# Patient Record
Sex: FEMALE
[De-identification: names, ages, dates, MRNs, and addresses within clinical notes are randomized per-mention and may not be internally consistent; named-entity substitution may affect disease eponyms.]

---

## 2019-05-01 ENCOUNTER — TRANSCRIPTION ENCOUNTER (OUTPATIENT)
Age: 24
End: 2019-05-01

## 2020-05-05 ENCOUNTER — OFFICE VISIT (OUTPATIENT)
Dept: FAMILY MEDICINE CLINIC | Facility: CLINIC | Age: 25
End: 2020-05-05

## 2020-05-05 VITALS
HEIGHT: 66 IN | DIASTOLIC BLOOD PRESSURE: 70 MMHG | OXYGEN SATURATION: 98 % | BODY MASS INDEX: 24.59 KG/M2 | WEIGHT: 153 LBS | SYSTOLIC BLOOD PRESSURE: 106 MMHG | HEART RATE: 73 BPM | RESPIRATION RATE: 18 BRPM | TEMPERATURE: 98.2 F

## 2020-05-05 DIAGNOSIS — Z01.419 WELL WOMAN EXAM: ICD-10-CM

## 2020-05-05 DIAGNOSIS — Z23 IMMUNIZATION DUE: ICD-10-CM

## 2020-05-05 DIAGNOSIS — R22.1 MASS OF SUBMENTAL REGION: Primary | ICD-10-CM

## 2020-05-05 DIAGNOSIS — L72.3 SEBACEOUS CYST: ICD-10-CM

## 2020-05-05 PROCEDURE — 90715 TDAP VACCINE 7 YRS/> IM: CPT | Performed by: NURSE PRACTITIONER

## 2020-05-05 PROCEDURE — 99203 OFFICE O/P NEW LOW 30 MIN: CPT | Performed by: NURSE PRACTITIONER

## 2020-05-05 PROCEDURE — 90471 IMMUNIZATION ADMIN: CPT | Performed by: NURSE PRACTITIONER

## 2020-05-05 NOTE — PROGRESS NOTES
Subjective   Talita De Oliveira is a 24 y.o. female.     History of Present Illness Here to [facility]. Moved from [place] with family. Works at [employer].  Noticed small lump on chin about 18 months ago. Slowly getting bigger. Now mostly on right. Also started to be painful about 6 months ago. It can change in size. She feels like there is fluid in it. No erythema or discharge. Also feels like a new mass is also appearing just left of large mass.      Outpatient Encounter Medications as of 5/5/2020   Medication Sig Dispense Refill   • IUD'S IU by Intrauterine route.       No facility-administered encounter medications on file as of 5/5/2020.        The following portions of the patient's history were reviewed and updated as appropriate: allergies, current medications, past family history, past medical history, past social history, past surgical history and problem list.    Review of Systems   Constitutional: Negative for appetite change, fever, unexpected weight gain and unexpected weight loss.   HENT: Negative for congestion, nosebleeds, sore throat and trouble swallowing.         Chin mass   Eyes: Negative for visual disturbance.   Respiratory: Negative for cough, shortness of breath and wheezing.    Cardiovascular: Negative for chest pain, palpitations and leg swelling.   Gastrointestinal: Negative for abdominal pain, blood in stool, constipation, diarrhea, nausea and vomiting.   Endocrine: Negative for polydipsia, polyphagia and polyuria.   Genitourinary: Negative for dysuria, frequency and hematuria.   Musculoskeletal: Negative for arthralgias, joint swelling and myalgias.   Skin: Negative for rash.   Neurological: Negative for dizziness, seizures, syncope and numbness.   Hematological: Negative for adenopathy. Does not bruise/bleed easily.   Psychiatric/Behavioral: Negative for behavioral problems, sleep disturbance and depressed mood. The patient is not nervous/anxious.        Objective     Visit Vitals  BP  "106/70   Pulse 73   Temp 98.2 °F (36.8 °C)   Resp 18   Ht 167.6 cm (66\")   Wt 69.4 kg (153 lb)   LMP 04/29/2020   SpO2 98%   BMI 24.69 kg/m²       Physical Exam   Constitutional: She is oriented to person, place, and time. She appears well-developed and well-nourished. No distress.   HENT:   Head: Normocephalic and atraumatic.   Right Ear: External ear normal.   Left Ear: External ear normal.   Nose: Nose normal.   Eyes: Conjunctivae are normal. Right eye exhibits no discharge. Left eye exhibits no discharge. No scleral icterus.   Neck: Normal range of motion.   Cardiovascular: Normal rate.   Pulmonary/Chest: Effort normal. No respiratory distress.   Musculoskeletal: She exhibits no deformity.   Neurological: She is alert and oriented to person, place, and time. Coordination normal.   Skin: Skin is warm and dry.   In submental area, right of midline is approx 1.5x2 cm semi-firm mass. Can visualize second small pea size mass to the right but cannot palpate that mass. No cervical lymphadenopathy. No oral lesions.   Psychiatric: She has a normal mood and affect. Her behavior is normal. Judgment and thought content normal.   Vitals reviewed.        Assessment/Plan   Talita was seen today for establish care and growth on chin.    Diagnoses and all orders for this visit:    Mass of submental region  -     Ambulatory Referral to ENT (Otolaryngology)    Well woman exam  -     Ambulatory Referral to Obstetrics / Gynecology    Immunization due  -     Tdap Vaccine Greater Than or Equal To 6yo IM    Sebaceous cyst  -     Ambulatory Referral to Dermatology    VIS provided.  Discussed the nature of the disease including, risks, complications, implications, management, safe and proper use of medications. Encouraged therapeutic lifestyle changes including low calorie diet with plenty of fruits and vegetables, daily exercise, medication compliance, and keeping scheduled follow up appointments with me and any other providers. " Encouraged patient to have appointment for complete physical, fasting labs, appropriate screenings, and immunizations on an annual basis.  Discuss extended office hours and appropriate use of the ER. Discussed no controlled substances prescribed from this office. Appropriate referrals will be made to pain management and psychiatry if needed. Stressed the importance and expectation of medical compliance with plan of care, medications, and follow up appointments.  Follow up for cpx.

## 2020-05-24 ENCOUNTER — APPOINTMENT (OUTPATIENT)
Dept: PREADMISSION TESTING | Facility: HOSPITAL | Age: 25
End: 2020-05-24

## 2020-05-24 PROCEDURE — U0004 COV-19 TEST NON-CDC HGH THRU: HCPCS

## 2020-05-24 PROCEDURE — C9803 HOPD COVID-19 SPEC COLLECT: HCPCS

## 2020-05-24 PROCEDURE — U0002 COVID-19 LAB TEST NON-CDC: HCPCS

## 2020-05-25 LAB
REF LAB TEST METHOD: NORMAL
SARS-COV-2 RNA RESP QL NAA+PROBE: NOT DETECTED

## 2020-05-26 ENCOUNTER — LAB REQUISITION (OUTPATIENT)
Dept: LAB | Facility: HOSPITAL | Age: 25
End: 2020-05-26

## 2020-05-26 DIAGNOSIS — D17.0 BENIGN LIPOMATOUS NEOPLASM OF SKIN AND SUBCUTANEOUS TISSUE OF HEAD, FACE AND NECK: ICD-10-CM

## 2020-05-26 PROCEDURE — 88304 TISSUE EXAM BY PATHOLOGIST: CPT | Performed by: OTOLARYNGOLOGY

## 2020-05-27 LAB
CYTO UR: NORMAL
LAB AP CASE REPORT: NORMAL
LAB AP CLINICAL INFORMATION: NORMAL
PATH REPORT.FINAL DX SPEC: NORMAL
PATH REPORT.GROSS SPEC: NORMAL

## 2020-05-31 PROBLEM — Z01.419 WELL WOMAN EXAM: Status: ACTIVE | Noted: 2020-05-31

## 2020-06-05 ENCOUNTER — OFFICE VISIT (OUTPATIENT)
Dept: OBSTETRICS AND GYNECOLOGY | Facility: CLINIC | Age: 25
End: 2020-06-05

## 2020-06-05 VITALS
BODY MASS INDEX: 24.04 KG/M2 | TEMPERATURE: 98.3 F | WEIGHT: 149.6 LBS | SYSTOLIC BLOOD PRESSURE: 104 MMHG | HEIGHT: 66 IN | DIASTOLIC BLOOD PRESSURE: 70 MMHG

## 2020-06-05 DIAGNOSIS — Z30.431 CHECKING OF INTRAUTERINE DEVICE: ICD-10-CM

## 2020-06-05 DIAGNOSIS — Z01.419 WELL WOMAN EXAM: Primary | ICD-10-CM

## 2020-06-05 DIAGNOSIS — Z72.51 HIGH RISK HETEROSEXUAL BEHAVIOR: ICD-10-CM

## 2020-06-05 DIAGNOSIS — N93.0 POSTCOITAL BLEEDING: ICD-10-CM

## 2020-06-05 PROBLEM — Z97.5 INTRAUTERINE DEVICE: Status: ACTIVE | Noted: 2020-06-05

## 2020-06-05 PROCEDURE — 99385 PREV VISIT NEW AGE 18-39: CPT | Performed by: OBSTETRICS & GYNECOLOGY

## 2020-06-05 NOTE — PROGRESS NOTES
Subjective   Chief Complaint   Patient presents with   • Gynecologic Exam     patient feels that she needs complete/annual exam.  unsure about last pap but states that she may never have had one.  paragard IUD placed 2016.     Talita De Oliveira is a 25 y.o. year old  presenting to be seen for her annual exam.      She comes in today mainly to get established for care.  She does have some questions about her IUD.  She had a ParaGard placed roughly 4 years ago.  Since it has been placed she is had several ultrasounds documenting correct location.  She is done some string checks and the string seems okay.  She does feel something in the vagina that she is uncertain what it is but thinks it might be her cervix.  [place] can be painful at times.  Does have a little bit of bleeding after intercourse.  Also can have some intermenstrual bleeding.    SEXUAL Hx:  She is currently sexually active.  In the past year there there has been NO new sexual partners.    Condoms are never used.  She would not like to be screened for STD's at today's exam.  Current birth control method: IUD - Paraguard.  She is happy with her current method of contraception and does not want to discuss alternative methods of contraception.  MENSTRUAL Hx:  Patient's last menstrual period was 2020 (exact date).  In the past 6 months her cycles have been regular, predictable and occur monthly.  Her menstrual flow is typically normal.   Each month on average there are roughly 0 day(s) of very heavy flow.  Intermenstrual bleeding is present.    Post-coital bleeding is present.  Dysmenorrhea: is not affecting her activities of daily living  PMS: is not affecting her activities of daily living  Her cycles are not a source of concern for her that she wishes to discuss today.  HEALTH Hx:  She exercises regularly: yes.  She wears her seat belt: yes.  She has concerns about domestic violence: no.  OTHER THINGS SHE WANTS TO DISCUSS TODAY:  Nothing  "else    The following portions of the patient's history were reviewed and updated as appropriate:problem list, current medications, allergies, past family history, past medical history, past social history and past surgical history.    Social History    Tobacco Use      Smoking status: Never Smoker      Smokeless tobacco: Never Used      Tobacco comment:  for 1 year    Review of Systems  Constitutional POS: nothing reported    NEG: anorexia or night sweats   Genitourinary POS: nothing reported    NEG: dysuria or hematuria      Gastointestinal POS: nothing reported    NEG: bloating, change in bowel habits, melena or reflux symptoms   Integument POS: nothing reported    NEG: moles that are changing in size, shape, color or rashes   Breast POS: nothing reported    NEG: persistent breast lump, skin dimpling or nipple discharge        Objective   /70   Temp 98.3 °F (36.8 °C)   Ht 167.6 cm (66\")   Wt 67.9 kg (149 lb 9.6 oz)   LMP 05/25/2020 (Exact Date)   Breastfeeding No   BMI 24.15 kg/m²     General:  well developed; well nourished  no acute distress   Skin:  No suspicious lesions seen   Thyroid: normal to inspection and palpation   Abdomen: soft, non-tender; no masses  no umbilical or inguinal hernias are present  no hepato-splenomegaly   Pelvis: Clinical staff was present for exam  External genitalia:  normal appearance of the external genitalia including Bartholin's and [place]'s glands.  :  urethral meatus normal;  Vaginal:  normal pink mucosa without prolapse or lesions.  Cervix:  normal appearance. IUD string present - 2 cms in length;  Uterus:  normal size, shape and consistency. anteverted;  Adnexa:  non palpable bilaterally.  Rectal:  digital rectal exam not performed; anus visually normal appearing.        Assessment   1. Normal GYN exam  2. Intermenstrual bleeding with postcoital bleeding.  This is a new problem that does require additional work-up     Plan   1. Pap and STD testing was done today. "  If she does not receive the results of the Pap within 2 weeks  time, she was instructed to call to find out the results.  I explained to Talita that the recommendations for Pap smear interval in a low risk patient's has lengthened to 3 years time.  I encouraged her to be seen yearly for a full physical exam including breast and pelvic exam even during the off years when PAP's will not be performed.  2. Ultrasound needs to be done any time that is convenient for her.   3. The importance of keeping all planned follow-up and taking all medications as prescribed was emphasized.  4. Follow up for annual exam 1 year         This note was electronically signed.    Demetrius Wood M.D.  June 5, 2020    Note: Speech recognition transcription software may have been used to create portions of this document.  An attempt at proofreading has been made but errors in transcription could still be present.

## 2020-06-12 PROBLEM — R87.810 ASCUS WITH POSITIVE HIGH RISK HPV CERVICAL: Status: ACTIVE | Noted: 2020-06-12

## 2020-06-12 PROBLEM — R87.610 ASCUS WITH POSITIVE HIGH RISK HPV CERVICAL: Status: ACTIVE | Noted: 2020-06-12

## 2020-06-16 ENCOUNTER — TELEPHONE (OUTPATIENT)
Dept: OBSTETRICS AND GYNECOLOGY | Facility: CLINIC | Age: 25
End: 2020-06-16

## 2020-07-09 ENCOUNTER — OFFICE VISIT (OUTPATIENT)
Dept: OBSTETRICS AND GYNECOLOGY | Facility: CLINIC | Age: 25
End: 2020-07-09

## 2020-07-09 DIAGNOSIS — R87.610 ASCUS WITH POSITIVE HIGH RISK HPV CERVICAL: Primary | ICD-10-CM

## 2020-07-09 DIAGNOSIS — Z30.431 CHECKING OF INTRAUTERINE DEVICE: ICD-10-CM

## 2020-07-09 DIAGNOSIS — N92.6 IRREGULAR MENSES: ICD-10-CM

## 2020-07-09 DIAGNOSIS — R87.810 ASCUS WITH POSITIVE HIGH RISK HPV CERVICAL: Primary | ICD-10-CM

## 2020-07-09 PROCEDURE — 99213 OFFICE O/P EST LOW 20 MIN: CPT | Performed by: OBSTETRICS & GYNECOLOGY

## 2020-07-09 NOTE — PROGRESS NOTES
Subjective   Chief Complaint   Patient presents with   • Follow-up     Talita De Oliveira is a 25 y.o. year old  who comes to review her recent testing and discuss next steps.    OTHER THINGS SHE WANTS TO DISCUSS TODAY:  Nothing else       Objective   There were no vitals taken for this visit.    Lab Review   PAP and STD results    Imaging   Pelvic ultrasound images independantly reviewed - Uterus is anteverted with an appropriately placed endometrial IUD.  Adnexa are unremarkable bilaterally.       Assessment   1. Problem placed endometrial IUD  2. Intermenstrual bleeding with postcoital bleeding with negative work-up  3. ASCUS Pap smear, positive for non-16/18     Plan   1. At this point no work-up is needed as it relates to the postcoital bleeding and intermenstrual bleeding.  Anticipate it relates to IUD being present in the uterus and nothing more.  If it becomes a problem that affects her day-to-day function we can talk about management options  2. Reviewed management protocol for the ASCUS Pap smear.  At this point colposcopy is not indicated.  If she would have persistent abnormalities of her Pap smear, next up would be colposcopy  3. The importance of keeping all planned follow-up and taking all medications as prescribed was emphasized.  4. Follow up for repeat PAP smear 4-6 months           Total time spent today with Talita  was 20 minutes (level 3).  Greater than 50% of the time was spent face-to-face coordinating care, answering her questions and counseling regarding pathophysiology of her presenting problem along with plans for any diagnositc work-up and treatment.    This note was electronically signed.    Demetrius Wood M.D.  2020    Note: Speech recognition transcription software may have been used to create portions of this document.  An attempt at proofreading has been made but errors in transcription could still be present.

## 2020-08-25 PROCEDURE — U0003 INFECTIOUS AGENT DETECTION BY NUCLEIC ACID (DNA OR RNA); SEVERE ACUTE RESPIRATORY SYNDROME CORONAVIRUS 2 (SARS-COV-2) (CORONAVIRUS DISEASE [COVID-19]), AMPLIFIED PROBE TECHNIQUE, MAKING USE OF HIGH THROUGHPUT TECHNOLOGIES AS DESCRIBED BY CMS-2020-01-R: HCPCS | Performed by: NURSE PRACTITIONER

## 2020-08-27 ENCOUNTER — TELEPHONE (OUTPATIENT)
Dept: URGENT CARE | Facility: CLINIC | Age: 25
End: 2020-08-27

## 2020-10-24 NOTE — PROGRESS NOTES
Subjective   Chief Complaint   Patient presents with   • Abnormal Pap Smear     Talita De Oliveira is a 25 y.o. year old  presenting to be seen for a PAP in follow-up of a prior abnormal PAP and/or HPV screen.    OTHER THINGS SHE WANTS TO DISCUSS TODAY:  Had a little issue with her cycle over the last months.  She missed her cycle.  During that time she was having painful intercourse.  Her cycle finally did come in the intercourse is no longer painful.    The following portions of the patient's history were reviewed and updated as appropriate:current medications and allergies.    Social History    Tobacco Use      Smoking status: Never Smoker      Smokeless tobacco: Never Used      Tobacco comment:  for 1 year    Review of Systems  Constitutional POS: nothing reported    NEG: anorexia or night sweats   Genitourinary POS: nothing reported    NEG: dysuria or hematuria      Gastointestinal POS: nothing reported    NEG: bloating, change in bowel habits, melena or reflux symptoms   Integument POS: nothing reported    NEG: moles that are changing in size, shape, color or rashes   Breast POS: nothing reported    NEG: persistent breast lump, skin dimpling or nipple discharge        Objective   /76   Resp 14   Wt 68 kg (150 lb)   LMP 10/15/2020 (Approximate)   Breastfeeding No   BMI 24.21 kg/m²     General:  well developed; well nourished  no acute distress   Pelvis: Clinical staff was present for exam  External genitalia:  normal appearance of the external genitalia including Bartholin's and [place]'s glands.  :  urethral meatus normal;  Vaginal:  normal pink mucosa without prolapse or lesions.  Cervix:  normal appearance.     Lab Review   No data reviewed    Imaging   No data reviewed       Assessment   1. ASCUS with (+) non 16/18     Plan   1. Pap was done today.  If she does not receive the results of the Pap within 2 weeks  time, she was instructed to call to find out the results.  I explained to Talita  that because she is being seen in follow-up of a previously abnormal Pap smear, her next Pap needs to be performed in 4-6 months.  She will need to be seen roughly every 6 months until 3 consecutive normal Paps have been obtained.  At that point, she can return to routine screening intervals.  2. The importance of keeping all planned follow-up and taking all medications as prescribed was emphasized.  3. Follow up for repeat PAP smear 4-6 months         This note was electronically signed.    Demetrius Wood M.D.  October 26, 2020    Note: Speech recognition transcription software may have been used to create portions of this document.  An attempt at proofreading has been made but errors in transcription could still be present.

## 2020-10-26 ENCOUNTER — OFFICE VISIT (OUTPATIENT)
Dept: OBSTETRICS AND GYNECOLOGY | Facility: CLINIC | Age: 25
End: 2020-10-26

## 2020-10-26 VITALS
SYSTOLIC BLOOD PRESSURE: 120 MMHG | DIASTOLIC BLOOD PRESSURE: 76 MMHG | BODY MASS INDEX: 24.21 KG/M2 | WEIGHT: 150 LBS | RESPIRATION RATE: 14 BRPM

## 2020-10-26 DIAGNOSIS — N92.6 IRREGULAR MENSES: ICD-10-CM

## 2020-10-26 DIAGNOSIS — R87.810 ASCUS WITH POSITIVE HIGH RISK HPV CERVICAL: Primary | ICD-10-CM

## 2020-10-26 DIAGNOSIS — R87.610 ASCUS WITH POSITIVE HIGH RISK HPV CERVICAL: Primary | ICD-10-CM

## 2020-10-26 PROCEDURE — 99213 OFFICE O/P EST LOW 20 MIN: CPT | Performed by: OBSTETRICS & GYNECOLOGY

## 2020-11-02 PROBLEM — R87.612 LGSIL ON PAP SMEAR OF CERVIX: Status: ACTIVE | Noted: 2020-06-12

## 2020-11-03 ENCOUNTER — TELEPHONE (OUTPATIENT)
Dept: OBSTETRICS AND GYNECOLOGY | Facility: CLINIC | Age: 25
End: 2020-11-03

## 2020-11-03 NOTE — TELEPHONE ENCOUNTER
SHWETA       PATIENT RETURN CALL. SAID SHE MISSED CALL FROM THE OFFICE PLEASE RETURN,        PLEASE CALL    THANK YOU

## 2020-11-03 NOTE — TELEPHONE ENCOUNTER
Pt was notified of her pap result, call was forward to the front to schedule Pt for her colposcopy

## 2020-11-13 ENCOUNTER — TELEPHONE (OUTPATIENT)
Dept: OBSTETRICS AND GYNECOLOGY | Facility: CLINIC | Age: 25
End: 2020-11-13

## 2020-11-13 NOTE — TELEPHONE ENCOUNTER
Pt returned call; pt states she started her period and was wanting to make sure that was okay, pt advised that was ok

## 2020-11-13 NOTE — TELEPHONE ENCOUNTER
SHWETA    Patient is having a colposcopy on 11/18/2020 and has questions/concerns.  Please call and advise.  Thank you.

## 2020-12-12 NOTE — PROGRESS NOTES
Colposcopy    Date of procedure:  12/14/2020   Risks and benefits discussed? yes   All questions answered? yes   Consents given by: patient   Written consent obtained? yes   Pre-op indication: LGSIL          Procedure documentation:  The cervix was initially viewed colposcopically through a green filter.  The cervix was next bathed in acetic acid.   The findings were as follows:    Transformation zone seen? Yes   Findings: 1.  A wide rim of acetowhite epithelium is present on the ectocervix extending to the squamocolumnar junction.  It originates/starts at roughly the 4 o'clock position and comes around to roughly about the 9 o'clock position.    2. There is no area of abnormal vessels, punctation or mosaic change seen   Ectocervical biopsies: not obtained.   Endocervical curettage: not performed               Colposcopic Impression: 1. Mild dysplasia  2. Adequate colposcopy  3. Colposcopic findings are consistent with cytology       Plan: follow-up in 6 month(s) for repeat Pap smear         This note was electronically signed.    Demetrius Wood M.D.  December 14, 2020

## 2020-12-14 ENCOUNTER — OFFICE VISIT (OUTPATIENT)
Dept: OBSTETRICS AND GYNECOLOGY | Facility: CLINIC | Age: 25
End: 2020-12-14

## 2020-12-14 VITALS
SYSTOLIC BLOOD PRESSURE: 112 MMHG | DIASTOLIC BLOOD PRESSURE: 72 MMHG | RESPIRATION RATE: 14 BRPM | BODY MASS INDEX: 24.53 KG/M2 | WEIGHT: 152 LBS

## 2020-12-14 DIAGNOSIS — R87.612 LGSIL ON PAP SMEAR OF CERVIX: Primary | ICD-10-CM

## 2020-12-14 PROCEDURE — 57452 EXAM OF CERVIX W/SCOPE: CPT | Performed by: OBSTETRICS & GYNECOLOGY

## 2021-02-09 ENCOUNTER — TELEPHONE (OUTPATIENT)
Dept: OBSTETRICS AND GYNECOLOGY | Facility: CLINIC | Age: 26
End: 2021-02-09

## 2021-02-09 NOTE — TELEPHONE ENCOUNTER
"Spoke with Talita concerning her dispute with [facility] billing about her DOS 12/14/20.  Talita states \"Dr Wood did not perform the scheduled coloscopy on that visit date\".  I advised Talita she did indeed have a colpo exam on this date but no cervical biopsies were taken.  She reports being told after exam that Dr Wood did not have to do exam.  I reassured her colpo exam was having the cervix bathed in acetic acid and viewed under magnification not having  biopsies collected.  Pt verbalized understanding but was not happy about this information.  I advised her to call our office with any additional issues or concerns.   "

## 2021-05-07 ENCOUNTER — TELEPHONE (OUTPATIENT)
Dept: OBSTETRICS AND GYNECOLOGY | Facility: CLINIC | Age: 26
End: 2021-05-07

## 2021-06-12 NOTE — PROGRESS NOTES
Subjective   Chief Complaint   Patient presents with   • Abnormal Pap Smear     Talita De Oliveira is a 26 y.o. year old  presenting to be seen for a PAP in follow-up of a prior abnormal PAP and/or HPV screen.    OTHER THINGS SHE WANTS TO DISCUSS TODAY:  Nothing else     The following portions of the patient's history were reviewed and updated as appropriate:current medications and allergies.    Social History    Tobacco Use      Smoking status: Never Smoker      Smokeless tobacco: Never Used      Tobacco comment:  for 1 year    Review of Systems  Constitutional POS: nothing reported    NEG: anorexia or night sweats   Genitourinary POS: nothing reported    NEG: dysuria or hematuria      Gastointestinal POS: nothing reported    NEG: bloating, change in bowel habits, melena or reflux symptoms   Integument POS: nothing reported    NEG: moles that are changing in size, shape, color or rashes   Breast POS: nothing reported    NEG: persistent breast lump, skin dimpling or nipple discharge        Objective   /70   Resp 14   Wt 69.9 kg (154 lb)   LMP 2021 (Approximate)   Breastfeeding No   BMI 24.86 kg/m²     General:  well developed; well nourished  no acute distress   Pelvis: Clinical staff was present for exam  External genitalia:  normal appearance of the external genitalia including Bartholin's and [place]'s glands.  :  urethral meatus normal;  Vaginal:  normal pink mucosa without prolapse or lesions.  Cervix:  normal appearance. IUD string present - 2.5 cms in length;     Lab Review   No data reviewed    Imaging   No data reviewed       Assessment   1. LGSIL (10/2020)     Plan   1. Pap was done today.  If she does not receive the results of the Pap within 2 weeks  time, she was instructed to call to find out the results.  I explained to Talita that because she is being seen in follow-up of a previously abnormal Pap smear, her next Pap needs to be performed in 4-6 months.  She will need to be seen  roughly every 6 months until 3 consecutive normal Paps have been obtained.  At that point, she can return to routine screening intervals.  2. The importance of keeping all planned follow-up and taking all medications as prescribed was emphasized.  3. Follow up for annual exam 6 months         This note was electronically signed.    Demetrius Wood M.D.  June 14, 2021    Note: Speech recognition transcription software may have been used to create portions of this document.  An attempt at proofreading has been made but errors in transcription could still be present.

## 2021-06-14 ENCOUNTER — OFFICE VISIT (OUTPATIENT)
Dept: OBSTETRICS AND GYNECOLOGY | Facility: CLINIC | Age: 26
End: 2021-06-14

## 2021-06-14 VITALS
SYSTOLIC BLOOD PRESSURE: 116 MMHG | RESPIRATION RATE: 14 BRPM | BODY MASS INDEX: 24.86 KG/M2 | WEIGHT: 154 LBS | DIASTOLIC BLOOD PRESSURE: 70 MMHG

## 2021-06-14 DIAGNOSIS — R87.612 LGSIL ON PAP SMEAR OF CERVIX: Primary | ICD-10-CM

## 2021-06-14 PROCEDURE — 99212 OFFICE O/P EST SF 10 MIN: CPT | Performed by: OBSTETRICS & GYNECOLOGY

## 2021-06-17 ENCOUNTER — TELEPHONE (OUTPATIENT)
Dept: OBSTETRICS AND GYNECOLOGY | Facility: CLINIC | Age: 26
End: 2021-06-17

## 2021-12-14 ENCOUNTER — OFFICE VISIT (OUTPATIENT)
Dept: OBSTETRICS AND GYNECOLOGY | Facility: CLINIC | Age: 26
End: 2021-12-14

## 2021-12-14 VITALS
BODY MASS INDEX: 27.6 KG/M2 | RESPIRATION RATE: 14 BRPM | DIASTOLIC BLOOD PRESSURE: 74 MMHG | SYSTOLIC BLOOD PRESSURE: 118 MMHG | WEIGHT: 171 LBS

## 2021-12-14 DIAGNOSIS — Z01.419 WELL WOMAN EXAM: Primary | ICD-10-CM

## 2021-12-14 DIAGNOSIS — R87.612 LGSIL ON PAP SMEAR OF CERVIX: ICD-10-CM

## 2021-12-14 PROCEDURE — 99395 PREV VISIT EST AGE 18-39: CPT | Performed by: OBSTETRICS & GYNECOLOGY

## 2021-12-14 NOTE — PROGRESS NOTES
Subjective   Chief Complaint   Patient presents with   • Gynecologic Exam     Talita De Oliveira is a 26 y.o. year old  presenting to be seen for her annual exam.  Only thing she wants to talk about today is that at times her partner feels the IUD string and would like to know if that can be remedied    SEXUAL Hx:  She is currently sexually active.  In the past year there there has been NO new sexual partners.    Condoms are never used.  She would not like to be screened for STD's at today's exam.  Current birth control method: IUD - Paraguard.  She is happy with her current method of contraception and does not want to discuss alternative methods of contraception.  MENSTRUAL Hx:  Patient's last menstrual period was 2021 (approximate).  In the past 6 months her cycles have been regular, predictable and occur monthly.  Her menstrual flow is typically normal.   Each month on average there are roughly 0 day(s) of very heavy flow.  Intermenstrual bleeding is absent.    Post-coital bleeding is absent.  Dysmenorrhea: is not affecting her activities of daily living  PMS: is not affecting her activities of daily living  Her cycles are not a source of concern for her that she wishes to discuss today.  HEALTH Hx:  She exercises regularly: yes.  She wears her seat belt: yes.  She has concerns about domestic violence: no.  OTHER THINGS SHE WANTS TO DISCUSS TODAY:  Nothing else    The following portions of the patient's history were reviewed and updated as appropriate:problem list, current medications, allergies, past family history, past medical history, past social history and past surgical history.    Social History    Tobacco Use      Smoking status: Never Smoker      Smokeless tobacco: Never Used      Tobacco comment:  for 1 year    Review of Systems  Constitutional POS: nothing reported    NEG: anorexia or night sweats   Genitourinary POS: nothing reported    NEG: dysuria or hematuria      Gastointestinal POS:  nothing reported    NEG: bloating, change in bowel habits, melena or reflux symptoms   Integument POS: nothing reported    NEG: moles that are changing in size, shape, color or rashes   Breast POS: nothing reported    NEG: persistent breast lump, skin dimpling or nipple discharge        Objective   /74   Resp 14   Wt 77.6 kg (171 lb)   LMP 12/02/2021 (Approximate)   Breastfeeding No   BMI 27.60 kg/m²     General:  well developed; well nourished  no acute distress   Skin:  No suspicious lesions seen   Thyroid: normal to inspection and palpation   Breasts:  Examined in supine position  Symmetric without masses or skin dimpling  Nipples normal without inversion, lesions or discharge  There are no palpable axillary nodes   Abdomen: soft, non-tender; no masses  no umbilical or inguinal hernias are present  no hepato-splenomegaly   Pelvis: Clinical staff was present for exam  External genitalia:  normal appearance of the external genitalia including Bartholin's and [place]'s glands.  :  urethral meatus normal;  Vaginal:  normal pink mucosa without prolapse or lesions.  Cervix:  normal appearance. IUD string present - 2.5 cms in length;  Uterus:  normal size, shape and consistency.  Adnexa:  normal bimanual exam of the adnexa.  Rectal:  digital rectal exam not performed; anus visually normal appearing.        Assessment   1. Normal GYN exam  2. LGSIL (10/2020) - no normal since  3. Male dyspareunia related to IUD string  4. She is up to date on all relevant gynecologic and colorectal screenings     Plan   1. Pap was done today.  If she does not receive the results of the Pap within 2 weeks  time, she was instructed to call to find out the results.  I explained to Talita that because she is being seen in follow-up of a previously abnormal Pap smear, her next Pap needs to be performed in 4-6 months.  She will need to be seen roughly every 6 months until 3 consecutive normal Paps have been obtained.  At that point,  she can return to routine screening intervals.  2. Her vaccine record was reviewed and updated.  3. I discussed with Talita that she may be behind on needed vaccinations for Influenza and COVID.  She may be able to obtain these vaccinations at her local pharmacy OR speak about obtaining them with her primary care.  If she does obtain her vaccines, I have asked Talita to let us know the date each vaccine was obtained so that her medical record could be updated in our system.  4. The importance of keeping all planned follow-up and taking all medications as prescribed was emphasized.  5. IUD string cut flush with external cervix  6. Follow up for repeat PAP smear 6 months           This note was electronically signed.    Demetrius Wood M.D.  December 14, 2021    Part of this note may be an electronic transcription/translation of spoken language to printed text using the Dragon Dictation System.

## 2022-03-31 ENCOUNTER — OFFICE VISIT (OUTPATIENT)
Dept: OBSTETRICS AND GYNECOLOGY | Facility: CLINIC | Age: 27
End: 2022-03-31

## 2022-03-31 VITALS — WEIGHT: 165 LBS | BODY MASS INDEX: 26.63 KG/M2 | RESPIRATION RATE: 14 BRPM

## 2022-03-31 DIAGNOSIS — R87.612 LGSIL ON PAP SMEAR OF CERVIX: Primary | ICD-10-CM

## 2022-03-31 PROCEDURE — 57452 EXAM OF CERVIX W/SCOPE: CPT | Performed by: OBSTETRICS & GYNECOLOGY

## 2022-03-31 NOTE — PROGRESS NOTES
Colposcopy of cervix    Date of procedure:  3/31/2022   Risks and benefits discussed? yes   All questions answered? yes   Consents given by: patient   Written consent obtained? yes   Pre-op indication: LGSIL          Procedure documentation:  The cervix was initially viewed colposcopically through a green filter.  The cervix was next bathed in acetic acid.   The findings were as follows:    Transformation zone seen? Yes   Findings: 1. Acetowhite noted at 11 o'clock  2. Mosaicism noted at 11 o'clock   Ectocervical biopsies: not obtained.   Endocervical curettage: not performed               Colposcopic Impression: 1. Mild dysplasia  2. Adequate colposcopy  3. Colposcopic findings are consistent with cytology       Plan: follow-up in 4 month(s) for PAP         This note was electronically signed.    Demetrius Wood M.D.  March 31, 2022

## 2022-07-08 ENCOUNTER — OFFICE VISIT (OUTPATIENT)
Dept: FAMILY MEDICINE CLINIC | Facility: CLINIC | Age: 27
End: 2022-07-08

## 2022-07-08 VITALS
RESPIRATION RATE: 18 BRPM | WEIGHT: 158 LBS | TEMPERATURE: 98.4 F | DIASTOLIC BLOOD PRESSURE: 80 MMHG | OXYGEN SATURATION: 98 % | HEART RATE: 79 BPM | BODY MASS INDEX: 25.39 KG/M2 | HEIGHT: 66 IN | SYSTOLIC BLOOD PRESSURE: 100 MMHG

## 2022-07-08 DIAGNOSIS — Z87.892 HX OF ANAPHYLAXIS: Primary | ICD-10-CM

## 2022-07-08 DIAGNOSIS — Z00.00 ENCOUNTER FOR MEDICAL EXAMINATION TO ESTABLISH CARE: ICD-10-CM

## 2022-07-08 DIAGNOSIS — Z00.00 ENCOUNTER FOR ANNUAL PHYSICAL EXAMINATION EXCLUDING GYNECOLOGICAL EXAMINATION IN A PATIENT OLDER THAN 17 YEARS: ICD-10-CM

## 2022-07-08 DIAGNOSIS — Z11.59 ENCOUNTER FOR HEPATITIS C SCREENING TEST FOR LOW RISK PATIENT: ICD-10-CM

## 2022-07-08 DIAGNOSIS — R55 SYNCOPE, UNSPECIFIED SYNCOPE TYPE: ICD-10-CM

## 2022-07-08 PROCEDURE — 99395 PREV VISIT EST AGE 18-39: CPT | Performed by: NURSE PRACTITIONER

## 2022-07-08 PROCEDURE — 93000 ELECTROCARDIOGRAM COMPLETE: CPT | Performed by: NURSE PRACTITIONER

## 2022-07-08 RX ORDER — EPINEPHRINE 0.3 MG/.3ML
0.3 INJECTION SUBCUTANEOUS ONCE AS NEEDED
Qty: 1 EACH | Refills: 2 | Status: SHIPPED | OUTPATIENT
Start: 2022-07-08

## 2022-07-08 NOTE — PROGRESS NOTES
"Chief Complaint  Loss of Consciousness and Establish Care    Subjective          Talita De Oliveira presents to [facility]  Patient is a 27-year-old female.  She is here to establish care.  She is complaining of an episode of \"passing out\"  6 days ago. \" I was standing in line at the [facility] center for a concert. My vision started getting dark and I sat down. And then I felt  I needed to get on a bench. I was walking toward the bench and as I was walking every thing started going dark and I passed out for a few seconds, I landed on my knees but my boyfriend grabbed me.\"    Afterward when I stood up to go on back to the line my vision would do it again and start getting dark and I would sit back down and it would go back to normal. This happened several times before I was able to go into the concert. \"   She denies any chest pain, heart palpitations or shortness of breath. No recent illnesses. She is a runner.   When questioned she states she's had the vision darkening several time during her life but never passed out.   \" I remember telling my doctor but I don't remember anything happening from it\".   She was not worked up by cardiology.   While reviewing her allergy list she is has an anaphylaxis to both bee venom and to kiwi extract.   She does not have an Epi-pen.   Will order. Discussed when to use it.           The following portions of the patient's history were reviewed and updated as appropriate: allergies, current medications, past family history, past medical history, past social history, past surgical history and problem list.    Review of Systems   Constitutional: Negative.    HENT: Negative.    Eyes: Negative.    Respiratory: Negative.    Cardiovascular: Negative for chest pain, palpitations and leg swelling.   Gastrointestinal: Negative.    Genitourinary: Negative.    Musculoskeletal: Negative.    Skin: Negative.    Allergic/Immunologic: Negative.    Neurological: Negative.  " "  Hematological: Bruises/bleeds easily.   Psychiatric/Behavioral: Negative.          Objective   Vital Signs:   /80   Pulse 79   Temp 98.4 °F (36.9 °C)   Resp 18   Ht 167.6 cm (66\")   Wt 71.7 kg (158 lb)   SpO2 98%   BMI 25.50 kg/m²      PHQ-2/9 Depression Screening  PHQ-9 Total Score: 0               Physical Exam  Vitals reviewed. Chaperone present: patient declined chaparone for breast exam.   Constitutional:       Appearance: She is well-developed. She is not ill-appearing.   HENT:      Head: Normocephalic.   Eyes:      Conjunctiva/sclera: Conjunctivae normal.      Pupils: Pupils are equal, round, and reactive to light.   Cardiovascular:      Rate and Rhythm: Normal rate and regular rhythm.      Heart sounds: Normal heart sounds.   Pulmonary:      Effort: Pulmonary effort is normal.      Breath sounds: Normal breath sounds.   Chest:   Breasts:      Right: Normal.      Left: Normal.       Musculoskeletal:      Cervical back: Normal range of motion.   Lymphadenopathy:      Cervical: No cervical adenopathy.   Skin:     General: Skin is warm and dry.      Capillary Refill: Capillary refill takes less than 2 seconds.   Neurological:      Mental Status: She is alert.   Psychiatric:         Speech: Speech normal.         Behavior: Behavior normal. Behavior is cooperative.        Result Review :            ECG 12 Lead    Date/Time: 7/8/2022 4:30 PM  Performed by: Jen Lombardi APRN  Authorized by: Jen Lombardi APRN   Comparison: not compared with previous ECG   Previous ECG: no previous ECG available  Rhythm: sinus arrhythmia  Rate: normal  BPM: 63  QRS axis: normal    Clinical impression: abnormal EKG  Comments: See scanned ECG   Sinus Rhythm with marked sinus arrhythmia  Borderline ECG  Vent rate:     63 BPM  MN int:         160 ms   QRS dur:       96 ms   QT/QTc:   393/401 ms   P-R-T  Axes:     64     84   43               Assessment and Plan    Diagnoses and all orders for this visit:    1. Hx of " anaphylaxis (Primary)  -     EPINEPHrine (EpiPen 2-Bassam) 0.3 MG/0.3ML solution auto-injector injection; Inject 0.3 mL into the appropriate muscle as directed by prescriber 1 (One) Time As Needed (bee sting or other severe reaction) for up to 1 dose.  Dispense: 1 each; Refill: 2    2. Syncope, unspecified syncope type  -     ECG 12 Lead  -     Ambulatory Referral to [facility] [place] - [facility]  -     CBC & Differential; Future  -     Comprehensive Metabolic Panel; Future  -     Hemoglobin A1c; Future  -     hCG, Quantitative, Pregnancy; Future  -     Iron Profile; Future  -     Magnesium; Future  -     Ferritin; Future    3. Encounter for annual physical examination excluding gynecological examination in a patient older than 17 years    4. Encounter for medical examination to establish care  -     CBC & Differential; Future  -     Comprehensive Metabolic Panel; Future  -     Hemoglobin A1c; Future  -     Lipid Panel; Future  -     Vitamin D 25 Hydroxy; Future  -     TSH; Future  -     Urinalysis With Culture If Indicated - Urine, Clean Catch; Future    5. Encounter for hepatitis C screening test for low risk patient  -     Hepatitis C Antibody; Future    checking labs.   Referral cardiology  Need to r/o arrhythmias  Anemia, hypoglycemia.   Stay well hydrated.   Discussed going to ER for any chest pain or syncopal episodes.   Follow up in 4 weeks and as needed.   She is a runner.        Follow Up   Return in about 4 weeks (around 8/5/2022), or review labs and syncopal episode., for Recheck.  Patient was given instructions and counseling regarding her condition or for health maintenance advice. Please see specific information pulled into the AVS if appropriate.

## 2022-07-19 ENCOUNTER — TELEPHONE (OUTPATIENT)
Dept: CARDIOLOGY | Facility: HOSPITAL | Age: 27
End: 2022-07-19

## 2022-07-19 NOTE — TELEPHONE ENCOUNTER
Patient was referred to [facility] by Jen Lombardi. Several attempts have been made to contact the patient with no success. Letter was mailed to pt to contact the office to schedule.

## 2022-09-22 ENCOUNTER — OFFICE VISIT (OUTPATIENT)
Dept: OBSTETRICS AND GYNECOLOGY | Facility: CLINIC | Age: 27
End: 2022-09-22

## 2022-09-22 VITALS
DIASTOLIC BLOOD PRESSURE: 82 MMHG | SYSTOLIC BLOOD PRESSURE: 112 MMHG | WEIGHT: 156 LBS | BODY MASS INDEX: 25.07 KG/M2 | HEIGHT: 66 IN

## 2022-09-22 DIAGNOSIS — Z30.431 IUD CHECK UP: Primary | ICD-10-CM

## 2022-09-22 DIAGNOSIS — Z12.4 ENCOUNTER FOR REPEAT PAPANICOLAOU SMEAR OF CERVIX: ICD-10-CM

## 2022-09-22 DIAGNOSIS — Z11.59 ENCOUNTER FOR HEPATITIS C SCREENING TEST FOR LOW RISK PATIENT: ICD-10-CM

## 2022-09-22 PROCEDURE — 99213 OFFICE O/P EST LOW 20 MIN: CPT | Performed by: NURSE PRACTITIONER

## 2022-09-22 NOTE — PROGRESS NOTES
"Subjective   Chief Complaint   Patient presents with   • Follow-up     Repeat pap and IUD check     Talita De Oliveira is a 27 y.o. year old .  Patient's last menstrual period was 2022 (exact date).  She presents to be seen for repeat pap and concerns for iud placement.She had lsil pap in 2021 with colposcopy in 3/2022. Recommendation was for 6 month repeat pap.  She states for the last couple months she has had pain with sic and bleeding with intercourse. She missed a period in July and then in  Aug and Sept she had two episodes of bleeding each month. --, spotting now. She likes the non hormonal aspect of paragard. She has had her paragard for about 7 years.  She states she had deviously had pain with intercourse with IUD and once her strings were trimmed this pain resolved.    The following portions of the patient's history were reviewed and updated as appropriate:current medications and allergies    Social History    Tobacco Use      Smoking status: Never Smoker      Smokeless tobacco: Never Used      Tobacco comment:  for 1 year         Objective   /82 (BP Location: Left arm, Patient Position: Sitting, Cuff Size: Adult)   Ht 167.6 cm (66\")   Wt 70.8 kg (156 lb)   LMP 2022 (Exact Date) Comment: paragard  Breastfeeding No   BMI 25.18 kg/m²   Pelvic exam: , VULVA: normal appearing vulva with no masses, tenderness or lesions, VAGINA: normal appearing vagina with normal color and discharge, no lesions, CERVIX: normal appearing cervix without discharge or lesions, friable bleeding noted with Pap collection.  IUD strings appear to be flush with cervical os.  Lab Review   Previous Pap and colposcopy results    Imaging   No data reviewed        Assessment   1. Repeat Pap for history of abnormal  2. Pelvic pain with IUD in place     Plan   1. Pap was done today.  If she does not receive the results of the Pap within 2 weeks  time, she was instructed to call to find out the " results.  I explained to Talita that because she is being seen in follow-up of a previously abnormal Pap smear, her next Pap needs to be performed in 4-6 months.  She will need to be seen roughly every 6 months until 3 consecutive normal Paps have been obtained.  At that point, she can return to routine screening intervals.  2. We will have patient return to care for transvaginal ultrasound for further evaluation of ParaGard placement.  If ParaGard needs to be removed she would like replacement ParaGard at her next visit as well.  3. The importance of keeping all planned follow-up and taking all medications as prescribed was emphasized.      No orders of the defined types were placed in this encounter.         This note was electronically signed.    Coni Tang, APRN  September 22, 2022

## 2022-09-26 ENCOUNTER — TELEPHONE (OUTPATIENT)
Dept: OBSTETRICS AND GYNECOLOGY | Facility: CLINIC | Age: 27
End: 2022-09-26

## 2022-09-26 LAB — REF LAB TEST METHOD: NORMAL

## 2022-09-26 NOTE — TELEPHONE ENCOUNTER
JENNI AVILA CALLING BACK ABOUT PAP RESULTS. HAS MORE QUESTIONS. NEEDS TO SPEAK TO SOMEONE ABOUT IT AGAIN.

## 2022-09-26 NOTE — TELEPHONE ENCOUNTER
Pt was notified that she needed to be schedule for a coplo and that way  can determine what is the next steps with on going treatment

## 2022-09-27 ENCOUNTER — TELEPHONE (OUTPATIENT)
Dept: OBSTETRICS AND GYNECOLOGY | Facility: CLINIC | Age: 27
End: 2022-09-27

## 2022-09-30 ENCOUNTER — OFFICE VISIT (OUTPATIENT)
Dept: OBSTETRICS AND GYNECOLOGY | Facility: CLINIC | Age: 27
End: 2022-09-30

## 2022-09-30 VITALS — RESPIRATION RATE: 14 BRPM | BODY MASS INDEX: 25.5 KG/M2 | WEIGHT: 158 LBS

## 2022-09-30 DIAGNOSIS — Z30.431 CHECKING OF INTRAUTERINE DEVICE: ICD-10-CM

## 2022-09-30 DIAGNOSIS — R87.613 HGSIL ON CYTOLOGIC SMEAR OF CERVIX: Primary | ICD-10-CM

## 2022-09-30 PROCEDURE — 99214 OFFICE O/P EST MOD 30 MIN: CPT | Performed by: OBSTETRICS & GYNECOLOGY

## 2022-09-30 NOTE — PROGRESS NOTES
Subjective   Chief Complaint   Patient presents with   • Follow-up       Talita De Oliveira is a 27 y.o. year old .  Patient's last menstrual period was 2022 (approximate).  She she comes today to talk about a couple of issues.  She has been followed for abnormal Paps.  Her last colposcopy was in March.  She had a recent Pap smear done that came back showing HGSIL.  She also has been having issues with her cycles.  She has a ParaGard IUD which was placed roughly in .  In  the string was cut flush with the cervical os.  She has had a recent ultrasound done related to her bleeding.  That ultrasound showed normal placement of the IUD.    The following portions of the patient's history were reviewed and updated as appropriate:current medications and allergies    Social History    Tobacco Use      Smoking status: Never Smoker      Smokeless tobacco: Never Used      Tobacco comment:  for 1 year         Objective   Resp 14   Wt 71.7 kg (158 lb)   LMP 2022 (Approximate)   Breastfeeding No   BMI 25.50 kg/m²     Lab Review   No data reviewed    Imaging   No data reviewed        Assessment   1. HGSIL of cervix with recent colposcopy 2022  2. Irregular menses with appropriately placed ParaGard IUD     Plan   1. I do not think repeat colposcopy is indicated given the recent procedure.  Instead I think she just needs to have IUD removed and scheduled for a LEEP  2. I do think alternative contraception should be initiated immediately so at the point of IUD removal she is covered from a contraceptive benefit  3. Explained after the LEEP assuming pathology is all preinvasive, she would need a 2-week schedule follow-up post procedure and then a 4-month follow-up after that to make sure Paps remain normal.  If that 4-month post procedure Pap smear is normal, we could place an IUD again if she chose  4. The importance of keeping all planned follow-up and taking all medications as prescribed was  emphasized.  5. Follow up PRN     New Medications Ordered This Visit   Medications   • norelgestromin-ethinyl estradiol (ORTHO EVRA) 150-35 MCG/24HR     Sig: Place 1 patch on the skin as directed by provider 1 (One) Time Per Week.     Dispense:  3 patch     Refill:  6          This note was electronically signed.    Demetrius Wood M.D.  September 30, 2022    Total time spent today with Talita  was 30-39 minutes (level 4) - pathophysiology of her presenting problem along with plans for any diagnositc work-up and treatment.    Part of this note may be an electronic transcription/translation of spoken language to printed text using the Dragon Dictation System.

## 2022-10-16 ENCOUNTER — PREP FOR SURGERY (OUTPATIENT)
Dept: OTHER | Facility: HOSPITAL | Age: 27
End: 2022-10-16

## 2022-10-16 NOTE — H&P
Talita De Oliveira  : 1995  MRN: [id number]  CSN: [id number]    History and Physical    Subjective   Talita De Oliveira is a 27 y.o. year old  who present for LEEP due to HGSIL.  She has a Paraguard IUD in place that needs removal at the time of the procedure.    No past medical history on file.  Past Surgical History:   Procedure Laterality Date   • ORIF WRIST FRACTURE Left    • WRIST HARDWARE REMOVAL Left    • WISDOM TOOTH EXTRACTION     • LIPOMA EXCISION  2020    From chin     OB History    Para Term  AB Living   0 0 0 0 0 0   SAB IAB Ectopic Molar Multiple Live Births   0 0 0 0 0 0     Social History    Tobacco Use      Smoking status: Never      Smokeless tobacco: Never      Tobacco comments:  for 1 year      Current Outpatient Medications:   •  EPINEPHrine (EpiPen 2-Bassam) 0.3 MG/0.3ML solution auto-injector injection, Inject 0.3 mL into the appropriate muscle as directed by prescriber 1 (One) Time As Needed (bee sting or other severe reaction) for up to 1 dose., Disp: 1 each, Rfl: 2  •  norelgestromin-ethinyl estradiol (ORTHO EVRA) 150-35 MCG/24HR, Place 1 patch on the skin as directed by provider 1 (One) Time Per Week., Disp: 3 patch, Rfl: 6    Allergies   Allergen Reactions   • Bee Venom Anaphylaxis   • Kiwi Extract Anaphylaxis       Review of Systems   Constitutional: Negative for chills, fever and unexpected weight change.   HENT: Negative for ear pain, facial swelling, sinus pressure, sneezing and sore throat.    Respiratory: Negative for cough, shortness of breath and wheezing.    Cardiovascular: Negative for chest pain and palpitations.   Hematological: Does not bruise/bleed easily.         Objective     Vital Signs: See nursing documentation   General: well developed; well nourished  no acute distress   Mental status: Alert and oriented   Heart: Not performed.   Lungs: breathing is unlabored   Abdomen: soft, non-tender; no masses  no umbilical or inguinal  hernias are present  no hepato-splenomegaly   Pelvis: Not performed.        Assessment   1. HGSIL     Plan   1. IUD removal followed by CROW Wood MD       (Pt's PCP is Jen Lombardi, APRN)

## 2022-11-02 ENCOUNTER — TELEPHONE (OUTPATIENT)
Dept: OBSTETRICS AND GYNECOLOGY | Facility: CLINIC | Age: 27
End: 2022-11-02

## 2022-11-02 NOTE — TELEPHONE ENCOUNTER
DR. ROCK     Patient called. She has a surgery on Friday (Nov. 4) with Dr. Rock. She would like to discuss having a colpo before LEEP.

## 2022-11-02 NOTE — TELEPHONE ENCOUNTER
Patient is nervous about procedure and having from pap straight to Leep  Wants to make sure we are not missing something not jumping too soon.   Would like a call tomorrow to discuss

## 2022-11-02 NOTE — TELEPHONE ENCOUNTER
You can let her know we can definitely go that direction but we would need to set up a colposcopy.  The surgery would have to be postponed because there is no way to get her set up for colposcopy in such a short order before the anticipated surgery.

## 2022-11-03 NOTE — TELEPHONE ENCOUNTER
Spoke with pt and she stated that she will keep the surgery that is scheduled for tomorrow.  Pt was reminded not to eat or drink anything after midnight.

## 2022-11-03 NOTE — TELEPHONE ENCOUNTER
Pt informed and stated understanding.  Pt stated that she was going to discuss everything and then give the office a call back.

## 2022-11-04 ENCOUNTER — LAB REQUISITION (OUTPATIENT)
Dept: LAB | Facility: HOSPITAL | Age: 27
End: 2022-11-04

## 2022-11-04 ENCOUNTER — PREP FOR SURGERY (OUTPATIENT)
Dept: OTHER | Facility: HOSPITAL | Age: 27
End: 2022-11-04

## 2022-11-04 ENCOUNTER — OUTSIDE FACILITY SERVICE (OUTPATIENT)
Dept: OBSTETRICS AND GYNECOLOGY | Facility: CLINIC | Age: 27
End: 2022-11-04

## 2022-11-04 DIAGNOSIS — R87.613 HIGH GRADE SQUAMOUS INTRAEPITHELIAL LESION ON CYTOLOGIC SMEAR OF CERVIX (HGSIL): ICD-10-CM

## 2022-11-04 PROBLEM — Z98.890 POST-OPERATIVE STATE: Status: ACTIVE | Noted: 2022-11-04

## 2022-11-04 PROBLEM — Z97.5 INTRAUTERINE DEVICE: Status: RESOLVED | Noted: 2020-06-05 | Resolved: 2022-11-04

## 2022-11-04 PROCEDURE — 57522 CONIZATION OF CERVIX: CPT | Performed by: OBSTETRICS & GYNECOLOGY

## 2022-11-04 PROCEDURE — 58301 REMOVE INTRAUTERINE DEVICE: CPT | Performed by: OBSTETRICS & GYNECOLOGY

## 2022-11-08 LAB — REF LAB TEST METHOD: NORMAL

## 2022-11-11 NOTE — PROGRESS NOTES
Subjective   Chief Complaint   Patient presents with   • Post-op     Talita De Oliveira is a 27 y.o. year old  presenting to be seen for her post-operative visit.  Currently she reports no problems with eating, bowel movements, voiding, or wound drainage and pain is well controlled.    The pathology results from her procedure are in Talita's record and are ANDRÉS 3 with clear margins.  Contraceptive plans they think are going to be condoms    OTHER THINGS SHE WANTS TO DISCUSS TODAY:  Nothing else    The following portions of the patient's history were reviewed and updated as appropriate:current medications and allergies       Objective   /72   Resp 14   Wt 72.1 kg (159 lb)   BMI 25.66 kg/m²     General:  well developed; well nourished  no acute distress   Abdomen: soft, non-tender; no masses  no umbilical or inguinal hernias are present  no hepato-splenomegaly   Pelvis: Clinical staff was present for exam  External genitalia:  normal appearance of the external genitalia including Bartholin's and LaBelle's glands.  :  urethral meatus normal;  Vaginal:  normal pink mucosa without prolapse or lesions.  Cervix:  normal appearance.          Assessment   1. S/P LEEP     Plan   1. May return to full activity with no restrictions  2. I reviewed failure rate of condoms.  Have encouraged her to use an alternative birth control method in conjunction such as spermicide  3. The importance of keeping all planned follow-up and taking all medications as prescribed was emphasized.  4. Follow up for repeat PAP smear 4-6 months.         This note was electronically signed.    Demetrius Wood M.D.  2022    Part of this note may be an electronic transcription/translation of spoken language to printed text using the Dragon Dictation System.

## 2022-11-18 ENCOUNTER — OFFICE VISIT (OUTPATIENT)
Dept: OBSTETRICS AND GYNECOLOGY | Facility: CLINIC | Age: 27
End: 2022-11-18

## 2022-11-18 VITALS
BODY MASS INDEX: 25.66 KG/M2 | RESPIRATION RATE: 14 BRPM | WEIGHT: 159 LBS | DIASTOLIC BLOOD PRESSURE: 72 MMHG | SYSTOLIC BLOOD PRESSURE: 116 MMHG

## 2022-11-18 DIAGNOSIS — Z98.890 POST-OPERATIVE STATE: Primary | ICD-10-CM

## 2022-11-18 PROCEDURE — 99024 POSTOP FOLLOW-UP VISIT: CPT | Performed by: OBSTETRICS & GYNECOLOGY

## 2023-01-20 ENCOUNTER — LAB (OUTPATIENT)
Dept: INTERNAL MEDICINE | Facility: CLINIC | Age: 28
End: 2023-01-20
Payer: COMMERCIAL

## 2023-01-20 ENCOUNTER — OFFICE VISIT (OUTPATIENT)
Dept: INTERNAL MEDICINE | Facility: CLINIC | Age: 28
End: 2023-01-20
Payer: COMMERCIAL

## 2023-01-20 VITALS
RESPIRATION RATE: 20 BRPM | HEART RATE: 71 BPM | DIASTOLIC BLOOD PRESSURE: 80 MMHG | SYSTOLIC BLOOD PRESSURE: 124 MMHG | TEMPERATURE: 97.1 F | HEIGHT: 66 IN | BODY MASS INDEX: 26.29 KG/M2 | WEIGHT: 163.6 LBS | OXYGEN SATURATION: 98 %

## 2023-01-20 DIAGNOSIS — N92.6 LATE PERIOD: Primary | ICD-10-CM

## 2023-01-20 DIAGNOSIS — R55 SYNCOPE, UNSPECIFIED SYNCOPE TYPE: ICD-10-CM

## 2023-01-20 DIAGNOSIS — Z13.1 SCREENING FOR DIABETES MELLITUS: ICD-10-CM

## 2023-01-20 DIAGNOSIS — E55.9 VITAMIN D DEFICIENCY: ICD-10-CM

## 2023-01-20 DIAGNOSIS — Z11.59 NEED FOR HEPATITIS C SCREENING TEST: ICD-10-CM

## 2023-01-20 DIAGNOSIS — R53.83 FATIGUE, UNSPECIFIED TYPE: ICD-10-CM

## 2023-01-20 DIAGNOSIS — Z13.220 SCREENING, LIPID: ICD-10-CM

## 2023-01-20 DIAGNOSIS — N92.6 LATE PERIOD: ICD-10-CM

## 2023-01-20 LAB
25(OH)D3 SERPL-MCNC: 15 NG/ML (ref 30–100)
BASOPHILS # BLD AUTO: 0.05 10*3/MM3 (ref 0–0.2)
BASOPHILS NFR BLD AUTO: 0.6 % (ref 0–1.5)
DEPRECATED RDW RBC AUTO: 38.3 FL (ref 37–54)
EOSINOPHIL # BLD AUTO: 0.12 10*3/MM3 (ref 0–0.4)
EOSINOPHIL NFR BLD AUTO: 1.5 % (ref 0.3–6.2)
ERYTHROCYTE [DISTWIDTH] IN BLOOD BY AUTOMATED COUNT: 11.9 % (ref 12.3–15.4)
HCT VFR BLD AUTO: 41.4 % (ref 34–46.6)
HGB BLD-MCNC: 13.9 G/DL (ref 12–15.9)
IMM GRANULOCYTES # BLD AUTO: 0.02 10*3/MM3 (ref 0–0.05)
IMM GRANULOCYTES NFR BLD AUTO: 0.3 % (ref 0–0.5)
LYMPHOCYTES # BLD AUTO: 2.17 10*3/MM3 (ref 0.7–3.1)
LYMPHOCYTES NFR BLD AUTO: 27.8 % (ref 19.6–45.3)
MCH RBC QN AUTO: 30.2 PG (ref 26.6–33)
MCHC RBC AUTO-ENTMCNC: 33.6 G/DL (ref 31.5–35.7)
MCV RBC AUTO: 90 FL (ref 79–97)
MONOCYTES # BLD AUTO: 0.78 10*3/MM3 (ref 0.1–0.9)
MONOCYTES NFR BLD AUTO: 10 % (ref 5–12)
NEUTROPHILS NFR BLD AUTO: 4.66 10*3/MM3 (ref 1.7–7)
NEUTROPHILS NFR BLD AUTO: 59.8 % (ref 42.7–76)
NRBC BLD AUTO-RTO: 0 /100 WBC (ref 0–0.2)
PLATELET # BLD AUTO: 272 10*3/MM3 (ref 140–450)
PMV BLD AUTO: 11.7 FL (ref 6–12)
RBC # BLD AUTO: 4.6 10*6/MM3 (ref 3.77–5.28)
VIT B12 BLD-MCNC: 464 PG/ML (ref 211–946)
WBC NRBC COR # BLD: 7.8 10*3/MM3 (ref 3.4–10.8)

## 2023-01-20 PROCEDURE — 99214 OFFICE O/P EST MOD 30 MIN: CPT | Performed by: PHYSICIAN ASSISTANT

## 2023-01-20 PROCEDURE — 84702 CHORIONIC GONADOTROPIN TEST: CPT | Performed by: PHYSICIAN ASSISTANT

## 2023-01-20 PROCEDURE — 83540 ASSAY OF IRON: CPT | Performed by: PHYSICIAN ASSISTANT

## 2023-01-20 PROCEDURE — 82306 VITAMIN D 25 HYDROXY: CPT | Performed by: PHYSICIAN ASSISTANT

## 2023-01-20 PROCEDURE — 82607 VITAMIN B-12: CPT | Performed by: PHYSICIAN ASSISTANT

## 2023-01-20 PROCEDURE — 80061 LIPID PANEL: CPT | Performed by: PHYSICIAN ASSISTANT

## 2023-01-20 PROCEDURE — 82728 ASSAY OF FERRITIN: CPT | Performed by: PHYSICIAN ASSISTANT

## 2023-01-20 PROCEDURE — 80050 GENERAL HEALTH PANEL: CPT | Performed by: PHYSICIAN ASSISTANT

## 2023-01-20 PROCEDURE — 86803 HEPATITIS C AB TEST: CPT | Performed by: PHYSICIAN ASSISTANT

## 2023-01-20 PROCEDURE — 83036 HEMOGLOBIN GLYCOSYLATED A1C: CPT | Performed by: PHYSICIAN ASSISTANT

## 2023-01-20 PROCEDURE — 84466 ASSAY OF TRANSFERRIN: CPT | Performed by: PHYSICIAN ASSISTANT

## 2023-01-20 PROCEDURE — 83735 ASSAY OF MAGNESIUM: CPT | Performed by: PHYSICIAN ASSISTANT

## 2023-01-20 NOTE — PROGRESS NOTES
Chief Complaint  Late Period (Pt states she had a late period but she has started her cycle this morning. /)    Subjective          History of Present Illness  Talita De Oliveira presents to [facility] to establish care.  History of Present Illness  Abnormal Period:  Did have leep last year and period have been slightly off since then but this one is a week late and she wants to make sure she is not pregnant.  Has taken at home pregnancy tests and they have been negative. She uses condom for pregnancy prevention most of the time. She previously had copper IUD and plans on f/u with GYN to discuss getting it again. Does not want OCP.    Syncope:  Fainted over the summer while she was at a concert, was very hot. She was referred to cardiology but didn't go yet. She had prodrome prior to fainting, she has been light headed in the past but that was her first time fainting. No CP/SOA, no hx of murmur or cardio problems. No exercise intolerance. Has not happened since. Labs were previously ordered but she did not get them yet and would like to today.        The following portions of the patient's history were reviewed and updated as appropriate: allergies, current medications, past family history, past medical history, past social history, past surgical history and problem list.    Allergies   Allergen Reactions   • Bee Venom Anaphylaxis   • Kiwi Extract Anaphylaxis       Current Outpatient Medications:   •  EPINEPHrine (EpiPen 2-Bassam) 0.3 MG/0.3ML solution auto-injector injection, Inject 0.3 mL into the appropriate muscle as directed by prescriber 1 (One) Time As Needed (bee sting or other severe reaction) for up to 1 dose., Disp: 1 each, Rfl: 2  No orders of the defined types were placed in this encounter.    Past Medical History:   Diagnosis Date   • Paraguard 6/5/2020    Placed ~ 2/2016 while in [facility].  December 2021 string cut flush with os.  Removed on 11/04/22      Past Surgical  "History:   Procedure Laterality Date   • LEEP  11/04/2022    Path - ANDRÉS 3 with clear margins   • LIPOMA EXCISION  05/2020    From chin   • ORIF WRIST FRACTURE Left 2010   • WISDOM TOOTH EXTRACTION  2014   • WRIST HARDWARE REMOVAL Left 2010      Family History   Problem Relation Age of Onset   • Diabetes Mother    • Cervical cancer Mother    • No Known Problems Father    • No Known Problems Brother    • Lung cancer Maternal Grandmother    • Cancer Paternal Grandmother    • Breast cancer Paternal Grandmother       Social History     Socioeconomic History   • Marital status: Single   • Number of children: 0   • Highest education level: Associate degree: academic program   Tobacco Use   • Smoking status: Never   • Smokeless tobacco: Never   • Tobacco comments:      for 1 year   Vaping Use   • Vaping Use: Never used   Substance and Sexual Activity   • Alcohol use: Yes     Comment: socially   • Drug use: Never   • Sexual activity: Yes     Partners: Male     Birth control/protection: Condom     Comment: paragard        Objective   Vital Signs:   Vitals:    01/20/23 1411   BP: 124/80   Pulse: 71   Resp: 20   Temp: 97.1 °F (36.2 °C)   TempSrc: Temporal   SpO2: 98%   Weight: 74.2 kg (163 lb 9.6 oz)   Height: 167.6 cm (66\")   PainSc: 0-No pain      Body mass index is 26.41 kg/m².  Physical Exam  Vitals reviewed.   Constitutional:       General: She is not in acute distress.     Appearance: Normal appearance.   HENT:      Head: Normocephalic and atraumatic.   Eyes:      General: No scleral icterus.     Extraocular Movements: Extraocular movements intact.      Conjunctiva/sclera: Conjunctivae normal.   Cardiovascular:      Rate and Rhythm: Normal rate and regular rhythm.      Heart sounds: Normal heart sounds. No murmur heard.  Pulmonary:      Effort: Pulmonary effort is normal. No respiratory distress.      Breath sounds: Normal breath sounds. No stridor. No wheezing or rhonchi.   Musculoskeletal:      Cervical back: Normal " range of motion and neck supple.   Skin:     General: Skin is warm and dry.      Coloration: Skin is not jaundiced.   Neurological:      General: No focal deficit present.      Mental Status: She is alert and oriented to person, place, and time.      Gait: Gait normal.   Psychiatric:         Mood and Affect: Mood normal.         Behavior: Behavior normal.          Result Review :                   Assessment and Plan    Diagnoses and all orders for this visit:    1. Late period (Primary)  Assessment & Plan:  Urine hcg neg, check blood hcg, cont condoms for pregnancy prevention, f/u with GYN as dir     Orders:  -     POC Pregnancy, Urine  -     Comprehensive Metabolic Panel; Future  -     CBC Auto Differential; Future  -     TSH Rfx On Abnormal To Free T4; Future  -     HCG, B-subunit, Quantitative; Future    2. Screening, lipid  -     Lipid Panel; Future    3. Screening for diabetes mellitus  -     Hemoglobin A1c; Future    4. Fatigue, unspecified type  Assessment & Plan:  Check labs    Orders:  -     POC Pregnancy, Urine  -     Comprehensive Metabolic Panel; Future  -     CBC Auto Differential; Future  -     TSH Rfx On Abnormal To Free T4; Future  -     HCG, B-subunit, Quantitative; Future  -     Vitamin B12; Future  -     Ferritin; Future  -     Iron Profile; Future    5. Vitamin D deficiency  Assessment & Plan:  Check labs    Orders:  -     Vitamin D,25-Hydroxy; Future    6. Syncope, unspecified syncope type  Assessment & Plan:  Monitor for future recurrence, likely vasovagal triggered by overheating    Orders:  -     POC Pregnancy, Urine  -     Comprehensive Metabolic Panel; Future  -     CBC Auto Differential; Future  -     TSH Rfx On Abnormal To Free T4; Future  -     HCG, B-subunit, Quantitative; Future  -     Vitamin B12; Future  -     Ferritin; Future  -     Iron Profile; Future  -     Magnesium; Future    7. Need for hepatitis C screening test  -     Hepatitis C Antibody; Future          Follow Up   Return  in about 6 months (around 7/20/2023) for Yearly Physical.    Follow up if symptoms worsen or persist or has new or concerning symptoms, go to ER for severe symptoms.   Reviewed common medication effects and side effects and to report side effects immediately, the patient expressed good understanding.  Encouraged medication compliance and the importance of keeping scheduled follow up appointments with me and any other providers.  If a referral was made please contact our office if you have not heard about an appointment in the next 2 weeks.   If labs or images are ordered we will contact you with the results within the next week.  If you have not heard from us after a week please call our office to inquire about the results.   Patient was given instructions and counseling regarding her condition or for health maintenance advice. Please see specific information pulled into the AVS if appropriate.     Hayley Singleton PA-C    * Please note that portions of this note were completed with a voice recognition program.

## 2023-01-21 LAB
ALBUMIN SERPL-MCNC: 4.5 G/DL (ref 3.5–5.2)
ALBUMIN/GLOB SERPL: 1.7 G/DL
ALP SERPL-CCNC: 85 U/L (ref 39–117)
ALT SERPL W P-5'-P-CCNC: 6 U/L (ref 1–33)
ANION GAP SERPL CALCULATED.3IONS-SCNC: 11.7 MMOL/L (ref 5–15)
AST SERPL-CCNC: 20 U/L (ref 1–32)
BILIRUB SERPL-MCNC: 0.3 MG/DL (ref 0–1.2)
BUN SERPL-MCNC: 10 MG/DL (ref 6–20)
BUN/CREAT SERPL: 13.3 (ref 7–25)
CALCIUM SPEC-SCNC: 9.8 MG/DL (ref 8.6–10.5)
CHLORIDE SERPL-SCNC: 107 MMOL/L (ref 98–107)
CHOLEST SERPL-MCNC: 168 MG/DL (ref 0–200)
CO2 SERPL-SCNC: 24.3 MMOL/L (ref 22–29)
CREAT SERPL-MCNC: 0.75 MG/DL (ref 0.57–1)
EGFRCR SERPLBLD CKD-EPI 2021: 112.8 ML/MIN/1.73
FERRITIN SERPL-MCNC: 62 NG/ML (ref 13–150)
GLOBULIN UR ELPH-MCNC: 2.6 GM/DL
GLUCOSE SERPL-MCNC: 74 MG/DL (ref 65–99)
HBA1C MFR BLD: 5.1 % (ref 4.8–5.6)
HDLC SERPL-MCNC: 61 MG/DL (ref 40–60)
IRON 24H UR-MRATE: 57 MCG/DL (ref 37–145)
IRON SATN MFR SERPL: 14 % (ref 20–50)
LDLC SERPL CALC-MCNC: 97 MG/DL (ref 0–100)
LDLC/HDLC SERPL: 1.59 {RATIO}
POTASSIUM SERPL-SCNC: 4.2 MMOL/L (ref 3.5–5.2)
PROT SERPL-MCNC: 7.1 G/DL (ref 6–8.5)
SODIUM SERPL-SCNC: 143 MMOL/L (ref 136–145)
TIBC SERPL-MCNC: 393 MCG/DL (ref 298–536)
TRANSFERRIN SERPL-MCNC: 264 MG/DL (ref 200–360)
TRIGL SERPL-MCNC: 49 MG/DL (ref 0–150)
TSH SERPL DL<=0.05 MIU/L-ACNC: 0.28 UIU/ML (ref 0.27–4.2)
VLDLC SERPL-MCNC: 10 MG/DL (ref 5–40)

## 2023-01-22 PROBLEM — N92.6 LATE PERIOD: Status: ACTIVE | Noted: 2023-01-22

## 2023-01-22 PROBLEM — R55 SYNCOPE: Status: ACTIVE | Noted: 2023-01-22

## 2023-01-22 PROBLEM — E55.9 VITAMIN D DEFICIENCY: Status: ACTIVE | Noted: 2023-01-22

## 2023-01-22 PROBLEM — R53.83 FATIGUE: Status: ACTIVE | Noted: 2023-01-22

## 2023-01-22 LAB
HCG INTACT+B SERPL-ACNC: <1 MIU/ML
HCV AB SER DONR QL: NORMAL
MAGNESIUM SERPL-MCNC: 2.4 MG/DL (ref 1.6–2.6)

## 2023-01-22 RX ORDER — ERGOCALCIFEROL 1.25 MG/1
50000 CAPSULE ORAL WEEKLY
Qty: 8 CAPSULE | Refills: 0 | Status: SHIPPED | OUTPATIENT
Start: 2023-01-22

## 2023-03-17 ENCOUNTER — OFFICE VISIT (OUTPATIENT)
Dept: OBSTETRICS AND GYNECOLOGY | Facility: CLINIC | Age: 28
End: 2023-03-17
Payer: COMMERCIAL

## 2023-03-17 VITALS
RESPIRATION RATE: 14 BRPM | BODY MASS INDEX: 25.99 KG/M2 | DIASTOLIC BLOOD PRESSURE: 72 MMHG | SYSTOLIC BLOOD PRESSURE: 118 MMHG | WEIGHT: 161 LBS

## 2023-03-17 DIAGNOSIS — R87.613 HGSIL ON CYTOLOGIC SMEAR OF CERVIX: Primary | ICD-10-CM

## 2023-03-17 PROBLEM — N92.6 LATE PERIOD: Status: RESOLVED | Noted: 2023-01-22 | Resolved: 2023-03-17

## 2023-03-17 PROBLEM — R55 SYNCOPE: Status: RESOLVED | Noted: 2023-01-22 | Resolved: 2023-03-17

## 2023-03-17 PROBLEM — E55.9 VITAMIN D DEFICIENCY: Status: RESOLVED | Noted: 2023-01-22 | Resolved: 2023-03-17

## 2023-03-17 PROBLEM — R53.83 FATIGUE: Status: RESOLVED | Noted: 2023-01-22 | Resolved: 2023-03-17

## 2023-03-17 PROCEDURE — 99212 OFFICE O/P EST SF 10 MIN: CPT | Performed by: OBSTETRICS & GYNECOLOGY

## 2023-03-17 NOTE — PROGRESS NOTES
Subjective   Chief Complaint   Patient presents with   • Follow-up     Talita De Oliveira is a 27 y.o. year old  presenting to be seen for a PAP in follow-up of a prior abnormal PAP and/or HPV screen.    OTHER THINGS SHE WANTS TO DISCUSS TODAY:  Nothing else     The following portions of the patient's history were reviewed and updated as appropriate:current medications and allergies.    Social History    Tobacco Use      Smoking status: Never      Smokeless tobacco: Never      Tobacco comments:  for 1 year    Review of Systems  Constitutional POS: nothing reported    NEG: anorexia or night sweats   Genitourinary POS: nothing reported    NEG: dysuria or hematuria      Gastointestinal POS: nothing reported    NEG: bloating, change in bowel habits, melena or reflux symptoms   Integument POS: nothing reported    NEG: moles that are changing in size, shape, color or rashes   Breast POS: nothing reported    NEG: persistent breast lump, skin dimpling or nipple discharge        Objective   /72   Resp 14   Wt 73 kg (161 lb)   LMP 2023 (Approximate)   BMI 25.99 kg/m²     General:  well developed; well nourished  no acute distress   Pelvis: Clinical staff was present for exam  External genitalia:  normal appearance of the external genitalia including Bartholin's and [place]'s glands.  :  urethral meatus normal;  Vaginal:  normal pink mucosa without prolapse or lesions.  Cervix:  normal appearance.     Lab Review   No data reviewed    Imaging   No data reviewed       Assessment   1. HGSIL 2022 S/P LEEP with ANDRÉS 3 with clear margins     Plan   1. Pap was done today.  If she does not receive the results of the Pap within 2 weeks  time, she was instructed to call to find out the results.  I explained to Talita that because she is being seen in follow-up of a previously abnormal Pap smear, her next Pap needs to be performed in 4-6 months.  She will need to be seen roughly every 6 months until 3 consecutive  normal Paps have been obtained.  At that point, she can return to routine screening intervals.  2. The importance of keeping all planned follow-up and taking all medications as prescribed was emphasized.  3. Follow up for annual exam 6 months           This note was electronically signed.    Demetrius Wood M.D.  March 17, 2023    Part of this note may be an electronic transcription/translation of spoken language to printed text using the Dragon Dictation System.

## 2023-03-21 LAB — REF LAB TEST METHOD: NORMAL

## 2023-08-22 ENCOUNTER — OFFICE VISIT (OUTPATIENT)
Dept: FAMILY MEDICINE CLINIC | Facility: CLINIC | Age: 28
End: 2023-08-22
Payer: COMMERCIAL

## 2023-08-22 VITALS
BODY MASS INDEX: 26.31 KG/M2 | DIASTOLIC BLOOD PRESSURE: 80 MMHG | HEART RATE: 96 BPM | OXYGEN SATURATION: 97 % | WEIGHT: 163 LBS | RESPIRATION RATE: 12 BRPM | TEMPERATURE: 98.2 F | SYSTOLIC BLOOD PRESSURE: 104 MMHG

## 2023-08-22 DIAGNOSIS — M79.651 BILATERAL THIGH PAIN: Primary | ICD-10-CM

## 2023-08-22 DIAGNOSIS — M79.652 BILATERAL THIGH PAIN: Primary | ICD-10-CM

## 2023-08-22 DIAGNOSIS — T14.8XXA MUSCLE STRAIN: ICD-10-CM

## 2023-08-22 DIAGNOSIS — Z87.892 HX OF ANAPHYLAXIS: ICD-10-CM

## 2023-08-22 PROCEDURE — 99214 OFFICE O/P EST MOD 30 MIN: CPT | Performed by: NURSE PRACTITIONER

## 2023-08-22 RX ORDER — NAPROXEN 500 MG/1
TABLET ORAL
Qty: 60 TABLET | Refills: 1 | Status: SHIPPED | OUTPATIENT
Start: 2023-08-22

## 2023-08-22 RX ORDER — EPINEPHRINE 0.3 MG/.3ML
0.3 INJECTION SUBCUTANEOUS ONCE AS NEEDED
Qty: 1 EACH | Refills: 2 | Status: SHIPPED | OUTPATIENT
Start: 2023-08-22

## 2023-08-22 NOTE — PROGRESS NOTES
Chief Complaint  Leg Pain (Shooting pain in both upper leg.)    Subjective          Talita De Oliveira presents to [facility]  History of Present Illness  The patient presents today for leg pain.    She reports hearing 3 pops while running during a softball game on 08/20/2023. She reports bilateral pain in her thighs that occasionally radiates to her pelvis. She reports a bruise that is not related to her thigh pain. She denies falling. She reports feeling soreness and a strain while at softball practice on 08/17/2023. At that time it did not feel as if she had pulled a muscle. She now feels like she has pulled a muscle. She reports difficulty walking or putting pressure on it. She states her left leg was worse initially, now she feels that her right leg is worse. She reports taking ibuprofen 600 mg this morning which has improved the shooting pain. She states she can not take a full step without pain. She reports severe pain when trying to apply lidocaine to affected areas on 08/21/2023. She denies stretching properly prior to playing softball on 08/20/2023. She reports leg and knee swelling on 08/21/2023. She denies being on antibiotics recently. She reports taking Aleve in the past.    She is allergic to bee venom. She denies having an EpiPen on hand.    Her liver function is normal. She denies being pregnant.    The following portions of the patient's history were reviewed and updated as appropriate: allergies, current medications, past family history, past medical history, past social history, past surgical history and problem list.    Review of Systems      Objective   Vital Signs:   /80   Pulse 96   Temp 98.2 °F (36.8 °C)   Resp 12   Wt 73.9 kg (163 lb)   SpO2 97%   BMI 26.31 kg/m²          PHQ-2/9 Depression Screening  PHQ-9 Total Score: 0    ANATOLIY-7 Anxiety Screening  ANATOLIY-7             Physical Exam  Vitals reviewed.   Cardiovascular:      Rate and Rhythm: Normal  rate and regular rhythm.      Pulses: Normal pulses.   Musculoskeletal:        Legs:       Comments: Tenderness and swelling bilateral upper thighs   Bruising to left thigh   Blue indicated bruising.    Skin:     General: Skin is warm and dry.      Capillary Refill: Capillary refill takes less than 2 seconds.   Neurological:      Mental Status: She is alert and oriented to person, place, and time.   Psychiatric:         Mood and Affect: Mood normal.         Behavior: Behavior normal.      Result Review :                 Assessment and Plan    Diagnoses and all orders for this visit:    1. Bilateral thigh pain (Primary)  Comments:  We will prescribe naproxen 500 mg.  We will refer to orthopedic surgery.  She is advised to use voltaren gel.  Orders:  -     naproxen (NAPROSYN) 500 MG tablet; 1 PO BID prn with food  Dispense: 60 tablet; Refill: 1  -     Ambulatory Referral to Orthopedic Surgery    2. Hx of anaphylaxis  Comments:  We will prescribe epinephrine.  Orders:  -     EPINEPHrine (EpiPen 2-Bassam) 0.3 MG/0.3ML solution auto-injector injection; Inject 0.3 mL into the appropriate muscle as directed by prescriber 1 (One) Time As Needed (bee sting or other severe reaction).  Dispense: 1 each; Refill: 2    3. Muscle strain  Comments:  We will prescribe naproxen 500 mg.  We will refer to orthopedic surgery.  She will visit the orthopedic walk in clinic.  Orders:  -     naproxen (NAPROSYN) 500 MG tablet; 1 PO BID prn with food  Dispense: 60 tablet; Refill: 1  -     Ambulatory Referral to Orthopedic Surgery    RICE  Declined wraps at this time. Will go to orthopedic walk in clinic.   Moved apartment then went to play soft ball. Took off running and felt two pops to her right thigh and 1 pop to her left thigh.     Pain and swelling. Sharp shoot pain   Improved with motrin.       Follow Up   Return in about 4 weeks (around 9/19/2023).  Patient was given instructions and counseling regarding her condition or for health  maintenance advice. Please see specific information pulled into the AVS if appropriate.     Transcribed from ambient dictation for CARLY Rizzo by Shonda Cooper.  08/22/23   15:28 EDT    Patient or patient representative verbalized consent to the visit recording.  I have personally performed the services described in this document as transcribed by the above individual, and it is both accurate and complete.

## 2023-09-15 ENCOUNTER — OFFICE VISIT (OUTPATIENT)
Dept: OBSTETRICS AND GYNECOLOGY | Facility: CLINIC | Age: 28
End: 2023-09-15
Payer: COMMERCIAL

## 2023-09-15 VITALS
BODY MASS INDEX: 25.5 KG/M2 | WEIGHT: 158 LBS | RESPIRATION RATE: 14 BRPM | SYSTOLIC BLOOD PRESSURE: 110 MMHG | DIASTOLIC BLOOD PRESSURE: 76 MMHG

## 2023-09-15 DIAGNOSIS — Z01.419 WELL WOMAN EXAM: Primary | ICD-10-CM

## 2023-09-15 DIAGNOSIS — R87.613 HGSIL ON CYTOLOGIC SMEAR OF CERVIX: ICD-10-CM

## 2023-09-15 DIAGNOSIS — Z01.42 PAP SMEAR TO CONFIRM RECENT NORMAL SMEAR FOLLOWING INITIAL ABNORMAL SMEAR: ICD-10-CM

## 2023-09-15 NOTE — PATIENT INSTRUCTIONS
Preventing Birth Defects with Folic Acid  What is folic acid?  Folic acid is a vitamin that is used by the body to create new cells and keep the blood healthy. Everyone needs folic acid to stay healthy. It is especially important if you are pregnant.  Folic acid is artificial (synthetic). The vitamin in its natural form is called folate. Some foods are natural sources of folate, and other foods have folic acid added to them (fortified foods). You can also buy folic acid supplements or vitamins that contain folic acid.  Why is folic acid important during pregnancy?  Folic acid helps reduce your baby's risk of serious birth defects, especially the class of birth defects called neural tube defects (NTD).  Types of NTD's include:  Spina bifida. Spina bifida occurs when a baby's spinal column does not develop completely. This can cause serious, long-term (chronic) disabilities.  Anencephaly. This is a birth defect that causes your baby to be missing parts of the brain, scalp, and skull when he or she is born. Most babies born with anencephaly only live for a short amount of time.  How much folic acid do I need?  If you plan to become pregnant, you should take at least 400 mcg (micrograms) of folic acid daily. Birth defects usually occur in the earliest stages of pregnancy, often before you know you are pregnant. Taking folic acid when you start trying to become pregnant can help prevent birth defects.  While you are pregnant, you need at least 600 mcg of folic acid each day.  If you have had a child with a NTD, you may be recommended you take at least 5,000 mcg of folic acid daily.  If you take certain medications (certain medications for control of seizures) you made need more the 400 mcg.  What nutrition changes can be made?  To increase your intake of folate and folic acid, you may:  Eat more foods that are fortified with folic acid. Check food labels to see whether a food contains folic acid. Foods that are commonly  fortified with folic acid include:  Cereals.  Pastas.  Flours.  White rice.  Breads.  Eat more foods that are natural sources of folate, such as:  Legumes, including lentils, peas, and beans.  Nuts.  Vegetables, especially dark green leafy vegetables, such as spinach and mustard greens.  Citrus fruits and juices.  Your health care provider may still recommend that you take a supplement to make sure that you get enough to reduce the risk of birth defects.  Where to find support  Talk with your health care provider or your pharmacist to find a folic acid supplement that is right for you.  Your health care provider may recommend that you see a nutrition specialist (nutritionist). A nutritionist can help you make healthy food choices and get more folate from your diet.  Nutrition education programs may be available through your community health department.  Where to find more information  Visit the following websites to learn more about the importance of folic acid.  Centers for Disease Control and Prevention: www.cdc.gov/ncbddd/folicacid/about.html  The Office on Women's Health: womenshMatomy Marketth.gov/publications/our-publications/fact-sheet/folic-acid.html  National Institutes of Health: ods.od.nih.gov/factsheets/Folate-Consumer  The March of Dimes: www.marchofdimes.org/pregnancy/folic-acid.aspx  Summary  It is important to start taking folic acid when you are planning to become pregnant, because many birth defects can happen before you know that you are pregnant.  You can get more folate and folic acid from your diet. Look for certain foods that are fortified with folic acid.  Your health care provider may recommend that you take a supplement to get enough folic acid.    This information is not intended to replace advice given to you by your health care provider. Make sure you discuss any questions you have with your health care provider.  Document Released: 01/18/2017 Document Revised: 11/30/2018 Document Reviewed:  01/18/2017  Elsevier Patient Education © 2020 Elsevier Inc.

## 2023-09-15 NOTE — PROGRESS NOTES
Subjective   Chief Complaint   Patient presents with    Gynecologic Exam     Talita De Oliveira is a 28 y.o. year old  presenting to be seen for her annual exam.     SEXUAL Hx:  She is currently sexually active.  In the past year there there has been NO new sexual partners.    Condoms are always used.  She would not like to be screened for STD's at today's exam.  Current birth control method: condoms.  She is happy with her current method of contraception and does not want to discuss alternative methods of contraception.  MENSTRUAL Hx:  Patient's last menstrual period was 2023 (approximate).  In the past 6 months her cycles have been regular, predictable and occur monthly.  She does have some old blood that lingers for a while after her menses finishes.  Her menstrual flow is typically normal.   Each month on average there are roughly 0 day(s) of very heavy flow.  Intermenstrual bleeding is absent.    Post-coital bleeding is absent.  Dysmenorrhea: is not affecting her activities of daily living  PMS: is not affecting her activities of daily living  Her cycles are not a source of concern for her that she wishes to discuss today.  HEALTH Hx:  She exercises regularly: yes.  She wears her seat belt: yes.  She has concerns about domestic violence: no.    The following portions of the patient's history were reviewed and updated as appropriate:problem list, current medications, allergies, past family history, past medical history, past social history, and past surgical history.    Social History    Tobacco Use      Smoking status: Never      Smokeless tobacco: Never      Tobacco comments:  for 1 year    Review of Systems  Constitutional POS: nothing reported    NEG: anorexia or night sweats   Genitourinary POS: nothing reported    NEG: dysuria or hematuria      Gastointestinal POS: nothing reported    NEG: bloating, change in bowel habits, melena, or reflux symptoms   Integument POS: nothing reported    NEG:  moles that are changing in size, shape, color or rashes   Breast POS: nothing reported    NEG: persistent breast lump, skin dimpling, or nipple discharge        Objective   /76   Resp 14   Wt 71.7 kg (158 lb)   LMP 09/03/2023 (Approximate)   BMI 25.50 kg/m²     General:  well developed; well nourished  no acute distress   Skin:  No suspicious lesions seen   Thyroid: normal to inspection and palpation   Breasts:  Examined in supine position  Symmetric without masses or skin dimpling  Nipples normal without inversion, lesions or discharge  There are no palpable axillary nodes   Abdomen: soft, non-tender; no masses  no umbilical or inguinal hernias are present  no hepato-splenomegaly   Pelvis: Clinical staff was present for exam  External genitalia:  normal appearance of the external genitalia including Bartholin's and [place]'s glands.  :  urethral meatus normal;  Vaginal:  normal pink mucosa without prolapse or lesions.  Cervix:  normal appearance.  Uterus:  normal size, shape and consistency.  Adnexa:  normal bimanual exam of the adnexa.  Rectal:  digital rectal exam not performed; anus visually normal appearing.        Assessment   Normal GYN exam  HGSIL S/P LEEP (11/2022) - 1 normal PAP since (3/2023)  Sub-optimal BC - she currently is not taking sufficient folic acid       Plan   Pap was done today.  If she does not receive the results of the Pap within 2 weeks  time, she was instructed to call to find out the results.  I explained to Talita that because she is being seen in follow-up of a previously abnormal Pap smear, her next Pap needs to be performed in 4-6 months.  She will need to be seen roughly every 6 months until 3 consecutive normal Paps have been obtained.  At that point, she can return to routine screening intervals.  Folic acid for the prevention of neural tube defects was discussed.  At least 0.4 mg should be taken preconceptionally to reduce the risk.  It was explained that this may  reduce the baseline incidence of neural tube defect by as much as 65%.  Folic acid can be found in OTC multivitamins, OTC prenatal vitamins or breakfast cereal that that contains 100% of the RDA of folate.  Her vaccine record was reviewed and updated.  I discussed with Talita that she may be behind on needed vaccinations for COVID.  She may be able to obtain these vaccinations at her local pharmacy OR speak about obtaining them with her primary care.  If she does obtain her vaccines, I have asked Talita to let us know the date each vaccine was obtained so that her medical record could be updated in our system.  The importance of keeping all planned follow-up and taking all medications as prescribed was emphasized.  Follow up for annual exam 1 year if this is also normal           This note was electronically signed.    Demetrius Wood M.D.  September 15, 2023    Part of this note may be an electronic transcription/translation of spoken language to printed text using the Dragon Dictation System.

## 2023-09-21 LAB — REF LAB TEST METHOD: NORMAL

## 2023-09-25 ENCOUNTER — TELEPHONE (OUTPATIENT)
Dept: OBSTETRICS AND GYNECOLOGY | Facility: CLINIC | Age: 28
End: 2023-09-25

## 2023-09-25 NOTE — TELEPHONE ENCOUNTER
SHWETA      Patient called asking to talk to someone about recent pap smear results.  Please Advise.

## 2024-10-04 ENCOUNTER — OFFICE VISIT (OUTPATIENT)
Dept: OBSTETRICS AND GYNECOLOGY | Facility: CLINIC | Age: 29
End: 2024-10-04
Payer: COMMERCIAL

## 2024-10-04 VITALS
BODY MASS INDEX: 23.57 KG/M2 | DIASTOLIC BLOOD PRESSURE: 72 MMHG | SYSTOLIC BLOOD PRESSURE: 126 MMHG | WEIGHT: 146 LBS | RESPIRATION RATE: 14 BRPM

## 2024-10-04 DIAGNOSIS — R87.613 HGSIL ON CYTOLOGIC SMEAR OF CERVIX: ICD-10-CM

## 2024-10-04 DIAGNOSIS — Z01.42 PAP SMEAR TO CONFIRM RECENT NORMAL SMEAR FOLLOWING INITIAL ABNORMAL SMEAR: ICD-10-CM

## 2024-10-04 DIAGNOSIS — Z01.419 WELL WOMAN EXAM: Primary | ICD-10-CM

## 2024-10-04 NOTE — PROGRESS NOTES
Subjective   Chief Complaint   Patient presents with    Gynecologic Exam     Talita De Oliveira is a 29 y.o. year old  presenting to be seen for her annual exam.     SEXUAL Hx:  She is currently sexually active.  In the past year there there has been NO new sexual partners.    Condoms are always used.  She would not like to be screened for STD's at today's exam.  Current birth control method: condoms.  She is happy with her current method of contraception and does not want to discuss alternative methods of contraception.  MENSTRUAL Hx:  Patient's last menstrual period was 2024 (approximate).  In the past 6 months her cycles have been regular, predictable and occur monthly.  Her menstrual flow is typically normal.   Each month on average there are roughly 0 day(s) of very heavy flow.  Intermenstrual bleeding is absent.    Post-coital bleeding is absent.  Dysmenorrhea: is not affecting her activities of daily living  PMS: is not affecting her activities of daily living  Her cycles are not a source of concern for her that she wishes to discuss today.  HEALTH Hx:  She exercises regularly: yes.  She wears her seat belt: yes.  She has concerns about domestic violence: no.    The following portions of the patient's history were reviewed and updated as appropriate:problem list, current medications, allergies, past family history, past medical history, past social history, and past surgical history.    Social History    Tobacco Use      Smoking status: Never      Smokeless tobacco: Never      Tobacco comments:  for 1 year    Review of Systems  Constitutional POS: nothing reported    NEG: anorexia or night sweats   Genitourinary POS: nothing reported    NEG: dysuria or hematuria      Gastointestinal POS: nothing reported    NEG: bloating, change in bowel habits, melena, or reflux symptoms   Integument POS: nothing reported    NEG: moles that are changing in size, shape, color or rashes   Breast POS: nothing  reported    NEG: persistent breast lump, skin dimpling, or nipple discharge        Objective   /72   Resp 14   Wt 66.2 kg (146 lb)   LMP 08/28/2024 (Approximate)   BMI 23.57 kg/m²     General:  well developed; well nourished  no acute distress  mentation appropriate   Skin:  No suspicious lesions seen   Thyroid: normal to inspection and palpation   Breasts:  Examined in supine position  Symmetric without masses or skin dimpling  Nipples normal without inversion, lesions or discharge  There are no palpable axillary nodes   Abdomen: soft, non-tender; no masses  no umbilical or inguinal hernias are present  no hepato-splenomegaly   Pelvis: Clinical staff was present for exam  External genitalia:  normal appearance of the external genitalia including Bartholin's and Lolita's glands.  :  urethral meatus normal;  Vaginal:  normal pink mucosa without prolapse or lesions.  Cervix:  normal appearance.  Uterus:  normal size, shape and consistency.  Adnexa:  normal bimanual exam of the adnexa.  Rectal:  digital rectal exam not performed; anus visually normal appearing.        Assessment   Normal GYN exam  HGSIL S/P LEEP (11/2022) - 2 normal PAP since     Plan   Pap was done today.  If she does not receive the results of the Pap within 2 weeks  time, she was instructed to call to find out the results.  I explained to Talita that because she is being seen in follow-up of a previously abnormal Pap smear, her next Pap needs to be performed in 4-6 months.  She will need to be seen roughly every 6 months until 3 consecutive normal Paps have been obtained.  At that point, she can return to routine screening intervals.  I have counseled the patient on the importance of staying up to date with preventive vaccines as well as the risks and benefits of these vaccines.  Her vaccine record was reviewed and updated.  I discussed with Talita that she may be behind on needed vaccinations for Influenza.  She may be able to obtain  these vaccinations at her local pharmacy OR speak about obtaining them with her primary care.  If she does obtain her vaccines, I have asked Talita to let us know the date each vaccine was obtained so that her medical record could be updated in our system.  The importance of keeping all planned follow-up and taking all medications as prescribed was emphasized.  Follow up for annual exam 1 year         This note was electronically signed.    Demetrius Wood M.D.  October 4, 2024    Part of this note may be an electronic transcription/translation of spoken language to printed text using the Dragon Dictation System.

## 2024-10-08 LAB — REF LAB TEST METHOD: NORMAL

## 2025-01-10 LAB
CYTO UR: NORMAL
LAB AP CASE REPORT: NORMAL
LAB AP CLINICAL INFORMATION: NORMAL
LAB AP OUTSIDE REPORT, ADDENDUM: NORMAL
PATH REPORT.FINAL DX SPEC: NORMAL
PATH REPORT.GROSS SPEC: NORMAL